# Patient Record
(demographics unavailable — no encounter records)

---

## 2025-01-16 NOTE — PHYSICAL EXAM
[Appropriately responsive] : appropriately responsive [Alert] : alert [No Acute Distress] : no acute distress [Soft] : soft [Non-tender] : non-tender [Non-distended] : non-distended [No HSM] : No HSM [No Lesions] : no lesions [No Mass] : no mass [FreeTextEntry7] : Erythematous area improved. 1cm wide opening @ umbilicus with no drainage; packing strip in situ

## 2025-01-16 NOTE — DISCUSSION/SUMMARY
[FreeTextEntry1] : This note was written by Howie Harper on 01/16/2025 actively solely TIANNA Dutta M.D.     All medical record entries made by the Scribe were at my, TIANNA Dutta M.D. direction and personally dictated by me on 01/16/2025. I have personally reviewed the chart and agree that the record reflects my personal performance of the history, physical exam, assessment, and plan.

## 2025-01-16 NOTE — HISTORY OF PRESENT ILLNESS
[FreeTextEntry1] : 40 year old P0 LMP 10/2024 presents as or follow up regarding wound dressing and packing. She is s/p laparoscopic myomectomy at [facility] on 12/17/24, and  was admitted to [facility] last week for an abscess collection in the umbilical incision. She was given IV antibiotics and her 1cm wide x 5-6cm deep umbilical incision opening was packed with strips. She reports she stopped the antibiotics due to side effects of vaginal yeast infection. She finished the antifungal, and reports little improvement. . Denies fevers. She reports chills, but states she normally gets chills. Incision has less drainage and slightly less pain.  VNS comes 3x/wk; pt packs the dressing herself on the other days.

## 2025-01-16 NOTE — HISTORY OF PRESENT ILLNESS
[FreeTextEntry1] : 40 year old P0 LMP 10/2024 presents as or follow up regarding wound dressing and packing. She is s/p laparoscopic myomectomy at [facility] on 12/17/24, and  was admitted to [facility] last week for an abscess collection in the umbilical incision. She was given IV antibiotics and her 1cm wide x 5-6cm deep umbilical incision opening was packed with strips. She reports she stopped the antibiotics due to side effects of vaginal yeast infection. She finished the antifungal, and reports little improvement. . Denies fevers. She reports chills, but states she normally gets chills. Incision has less drainage and slightly less pain.  VNS comes 3x/wk; pt packs the dressing herself on the other days.

## 2025-01-16 NOTE — PLAN
[FreeTextEntry1] : -Patient should resume on antibiotics. -Advised to take probiotics.  -Advised to take Ibuprofen. -RTO for f/u next week for wound dressing change with RN.